# Patient Record
Sex: MALE | Race: WHITE
[De-identification: names, ages, dates, MRNs, and addresses within clinical notes are randomized per-mention and may not be internally consistent; named-entity substitution may affect disease eponyms.]

---

## 2017-03-16 ENCOUNTER — HOSPITAL ENCOUNTER (OUTPATIENT)
Dept: HOSPITAL 75 - RAD | Age: 22
End: 2017-03-16
Attending: NURSE PRACTITIONER
Payer: COMMERCIAL
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
## 2017-03-16 DIAGNOSIS — S62.633A: Primary | ICD-10-CM

## 2017-03-16 PROCEDURE — 73140 X-RAY EXAM OF FINGER(S): CPT

## 2017-03-16 NOTE — DIAGNOSTIC IMAGING REPORT
INDICATION: Left hand injury.



EXAMINATION: AP, oblique and lateral views of the left third

finger were obtained.



FINDINGS: There is focal cortical disruption along the dorsal

aspect of the proximal shaft of the third distal phalanx. The

interphalangeal joints appear to be intact. There is no

radiopaque foreign body.



IMPRESSION: Nondisplaced fracture involving the proximal aspect

of the third distal phalanx without intra-articular extension.



Dictated by:



Dictated on workstation # [id number]
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
## 2018-08-14 ENCOUNTER — HOSPITAL ENCOUNTER (EMERGENCY)
Dept: HOSPITAL 75 - ER | Age: 23
Discharge: LEFT BEFORE DISCHARGE | End: 2018-08-14
Payer: SELF-PAY

## 2018-08-14 VITALS — DIASTOLIC BLOOD PRESSURE: 84 MMHG | SYSTOLIC BLOOD PRESSURE: 130 MMHG

## 2018-08-14 VITALS — HEIGHT: 71 IN | BODY MASS INDEX: 25.2 KG/M2 | WEIGHT: 180 LBS

## 2018-08-14 DIAGNOSIS — F10.10: ICD-10-CM

## 2018-08-14 DIAGNOSIS — Z79.51: ICD-10-CM

## 2018-08-14 DIAGNOSIS — F19.10: Primary | ICD-10-CM

## 2018-08-14 PROCEDURE — 99281 EMR DPT VST MAYX REQ PHY/QHP: CPT

## 2021-09-03 ENCOUNTER — HOSPITAL ENCOUNTER (EMERGENCY)
Dept: HOSPITAL 75 - ER | Age: 26
Discharge: HOME | End: 2021-09-03
Payer: SELF-PAY

## 2021-09-03 VITALS — SYSTOLIC BLOOD PRESSURE: 131 MMHG | DIASTOLIC BLOOD PRESSURE: 85 MMHG

## 2021-09-03 VITALS — WEIGHT: 209.88 LBS | BODY MASS INDEX: 30.05 KG/M2 | HEIGHT: 70 IN
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
## 2021-09-03 DIAGNOSIS — S20.462A: Primary | ICD-10-CM

## 2021-09-03 PROCEDURE — 99283 EMERGENCY DEPT VISIT LOW MDM: CPT

## 2021-09-03 NOTE — ED INTEGUMENTARY GENERAL
General


Chief Complaint:  Skin/Wound Problems


Stated Complaint:  SPIDER BITE ON BACK


Source:  patient


Exam Limitations:  no limitations





History of Present Illness


Date Seen by Provider:  Sep 3, 2021


Time Seen by Provider:  18:47


Initial Comments


Patient is a 25-year-old male who presents to the emergency room with a chief 

complaint of a concern for a "spider bite" to his left flank.  Patient noticed 

it 3 or 4 days ago.  He states he has noticed some increasing redness he has had

fevers and chills, generalized malaise and nausea.  He did not see a spider.  He

denies it having been a tick bite saying that he is mostly indoors all the time 

working on computers.  Denies any chest pain or shortness of breath.  No 

abdominal pain vomiting or diarrhea.  Has been taking Tylenol for fevers.





No allergies to medications.





All other review of systems reviewed and negative except as stated.


Timing/Duration:  getting worse


Severity:  moderate


Location:  torso (leflt flank)


Possible Cause:  insect bite (possible)


Associated Symptoms:  fever, malaise





Allergies and Home Medications


Allergies


Coded Allergies:  


     NKANo Known Allergies (Unverified  Allergy, Mild, 2/14/10)





Patient Home Medication List


Home Medication List Reviewed:  Yes


Diphenoxylate/Atropine (Lomotil Tab) 1 Ea Tablet, 1 EA PO Q6H PRN for DIARRHEA


   Prescribed by: ART SILVER on 2/12/14 1519


Sulfamethoxazole/Trimethoprim (Bactrim Ds Tablet) 1 Each Tablet, 1 EACH PO BID


   Prescribed by: SKYLAR IBARRA on 8/26/18 2051





Review of Systems


Review of Systems


Constitutional:  see HPI, chills, fever, malaise


EENTM:  no symptoms reported


Respiratory:  no symptoms reported


Cardiovascular:  no symptoms reported


Gastrointestinal:  nausea


Genitourinary:  no symptoms reported


Musculoskeletal:  no symptoms reported


Skin:  rash


Psychiatric/Neurological:  No Symptoms Reported





All Other Systems Reviewed


Negative Unless Noted:  Yes





Past Medical-Social-Family Hx


Immunizations Up To Date


Tetanus Booster (TDap):  Less than 5yrs


PED Vaccines UTD:  No





Past Medical History


Reproductive Disorders:  No


Sexually Transmitted Disease:  No


Adverse Reaction/Blood Tranf:  No





Family Medical History





Cancer


  03 MOTHER


Family history: Allergy


  03 MOTHER


Family history: Asthma


  03 MOTHER


Psychotic disorder


  03 FATHER





Physical Exam


Vital Signs


Capillary Refill :


General Appearance:  WD/WN, no apparent distress


HEENT:  PERRL/EOMI


Neck:  supple, normal inspection


Cardiovascular:  regular rate, rhythm


Respiratory:  lungs clear, normal breath sounds, no respiratory distress, no 

accessory muscle use


Extremities:  non-tender, normal inspection


Neurologic/Psychiatric:  alert, normal mood/affect, oriented x 3


Skin:  normal color, warm/dry


Skin Problem Location:  torso (left flank small nidus of bite with surrounding 

erythema, streaking anteriorly across the front of the  flank.  tender to 

palpation; no abscess/fluctuance/drainage)


Skin Problem Character:  erythema, linear


Lymphatic:  no adenopathy





Procedures/Interventions





   Suture Size:  5-0





Departure


Impression





   Primary Impression:  


   Insect bite


   Qualified Codes:  S20.462A - Insect bite (nonvenomous) of left back wall of 

   thorax, initial encounter; W57.XXXA - Bitten or stung by nonvenomous insect 

   and other nonvenomous arthropods, initial encounter


   Additional Impression:  


   Cellulitis


   Qualified Codes:  L03.312 - Cellulitis of back [any part except buttock]


Disposition:  01 HOME, SELF-CARE


Condition:  Stable





Departure-Patient Inst.


Decision time for Depature:  18:57


Referrals:  


[facility] [place]





NO,LOCAL PHYSICIAN (PCP)


Primary Care Physician


Patient Instructions:  Cellulitis (Skin Infection), Adult (DC)





Add. Discharge Instructions:  


take the antibiotics twice a day until they are gone for a total of 10days.





push lots of fluids while taking the antibiotics.





If after 48 hours you are still having fevers, increasing redness or pain, 

please come back to the emergency department for re-evaluation.\\





follow up with [facility].


Scripts


Sulfamethoxazole/Trimethoprim (Bactrim Ds Tablet) 1 Each Tablet


1 EACH PO BID for 10 Days, #19 TAB


   Prov: ART SMALL MD         9/3/21











ART SMALL MD          Sep 3, 2021 18:58